# Patient Record
Sex: FEMALE | Race: WHITE | NOT HISPANIC OR LATINO | URBAN - METROPOLITAN AREA
[De-identification: names, ages, dates, MRNs, and addresses within clinical notes are randomized per-mention and may not be internally consistent; named-entity substitution may affect disease eponyms.]

---

## 2019-07-27 ENCOUNTER — EMERGENCY (EMERGENCY)
Facility: HOSPITAL | Age: 24
LOS: 1 days | Discharge: HOME | End: 2019-07-27
Attending: EMERGENCY MEDICINE | Admitting: EMERGENCY MEDICINE
Payer: COMMERCIAL

## 2019-07-27 VITALS
HEART RATE: 79 BPM | RESPIRATION RATE: 18 BRPM | DIASTOLIC BLOOD PRESSURE: 73 MMHG | OXYGEN SATURATION: 97 % | TEMPERATURE: 98 F | SYSTOLIC BLOOD PRESSURE: 117 MMHG
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
## 2019-07-27 DIAGNOSIS — S06.0X0A CONCUSSION WITHOUT LOSS OF CONSCIOUSNESS, INITIAL ENCOUNTER: ICD-10-CM
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
## 2019-07-27 DIAGNOSIS — S09.90XA UNSPECIFIED INJURY OF HEAD, INITIAL ENCOUNTER: ICD-10-CM

## 2019-07-27 PROCEDURE — 99283 EMERGENCY DEPT VISIT LOW MDM: CPT

## 2019-07-27 RX ORDER — IBUPROFEN 200 MG
600 TABLET ORAL ONCE
Refills: 0 | Status: COMPLETED | OUTPATIENT
Start: 2019-07-27 | End: 2019-07-27

## 2019-07-27 RX ORDER — ACETAMINOPHEN 500 MG
975 TABLET ORAL ONCE
Refills: 0 | Status: COMPLETED | OUTPATIENT
Start: 2019-07-27 | End: 2019-07-27

## 2019-07-27 RX ORDER — ONDANSETRON 8 MG/1
4 TABLET, FILM COATED ORAL ONCE
Refills: 0 | Status: COMPLETED | OUTPATIENT
Start: 2019-07-27 | End: 2019-07-27

## 2019-07-27 RX ADMIN — ONDANSETRON 4 MILLIGRAM(S): 8 TABLET, FILM COATED ORAL at 21:53

## 2019-07-27 RX ADMIN — Medication 600 MILLIGRAM(S): at 21:52

## 2019-07-27 RX ADMIN — Medication 975 MILLIGRAM(S): at 21:56

## 2019-07-27 RX ADMIN — Medication 975 MILLIGRAM(S): at 21:53

## 2019-07-27 RX ADMIN — Medication 600 MILLIGRAM(S): at 21:56

## 2019-07-27 NOTE — ED PROVIDER NOTE - NSFOLLOWUPINSTRUCTIONS_ED_ALL_ED_FT
Rest, stay hydrated.    Acetaminophen 975mg -100mg every 34-6hrs (max 4000mg/day) plus  Ibuprofen 600mg every 6hg for pain/headaches.    Ondansetron (Zofran) as needed for nausea.    Please read concussion instructions.  Return to the ER for increased headaches or any worrisome symptoms.
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
## 2019-07-27 NOTE — ED PROVIDER NOTE - CARE PLAN
Principal Discharge DX:	Head injuries, initial encounter  Secondary Diagnosis:	Concussion without loss of consciousness, initial encounter

## 2019-07-27 NOTE — ED PROVIDER NOTE - CLINICAL SUMMARY MEDICAL DECISION MAKING FREE TEXT BOX
Patient presenting with likey mild concussion sx. Concussion precautions explained. Will tx Motrin/APAP and Zofran. Paper rx as she dn have a pharmacy in [place] and [place] of [place].

## 2023-01-01 NOTE — ED ADULT NURSE NOTE - CHPI ED NUR SYMPTOMS POS
HEADACHE Site: Sternum (31 Jan 2023 13:28)  Bilirubin: 5.8 (31 Jan 2023 13:28)   Site: Sternum (31 Jan 2023 20:58)  Bilirubin: 6.7 (31 Jan 2023 20:58)  Site: Sternum (31 Jan 2023 13:28)  Bilirubin: 5.8 (31 Jan 2023 13:28)

## 2023-02-28 NOTE — ED ADULT TRIAGE NOTE - NS ED NURSE BANDS TYPE
See assessment and plan for anxiety  Feeling very hopeless and lost all interest in things with decreased appetite  Denies SI/SH/HI - does rarely have a passive death wish but denies any plan or ever intend on commiting suicide  Name band;